# Patient Record
Sex: MALE | URBAN - METROPOLITAN AREA
[De-identification: names, ages, dates, MRNs, and addresses within clinical notes are randomized per-mention and may not be internally consistent; named-entity substitution may affect disease eponyms.]

---

## 2021-01-21 ENCOUNTER — APPOINTMENT (RX ONLY)
Dept: URBAN - METROPOLITAN AREA CLINIC 52 | Facility: CLINIC | Age: 51
Setting detail: DERMATOLOGY
End: 2021-01-21

## 2021-01-21 DIAGNOSIS — D22 MELANOCYTIC NEVI: ICD-10-CM

## 2021-01-21 DIAGNOSIS — D485 NEOPLASM OF UNCERTAIN BEHAVIOR OF SKIN: ICD-10-CM

## 2021-01-21 DIAGNOSIS — L21.8 OTHER SEBORRHEIC DERMATITIS: ICD-10-CM | Status: INADEQUATELY CONTROLLED

## 2021-01-21 PROBLEM — D22.39 MELANOCYTIC NEVI OF OTHER PARTS OF FACE: Status: ACTIVE | Noted: 2021-01-21

## 2021-01-21 PROBLEM — D48.5 NEOPLASM OF UNCERTAIN BEHAVIOR OF SKIN: Status: ACTIVE | Noted: 2021-01-21

## 2021-01-21 PROCEDURE — ? PRESCRIPTION

## 2021-01-21 PROCEDURE — 99203 OFFICE O/P NEW LOW 30 MIN: CPT

## 2021-01-21 PROCEDURE — ? DEFER

## 2021-01-21 PROCEDURE — ? FULL BODY SKIN EXAM - DECLINED

## 2021-01-21 PROCEDURE — ? COUNSELING

## 2021-01-21 RX ORDER — KETOCONAZOLE 20 MG/ML
1 SHAMPOO, SUSPENSION TOPICAL TIW
Qty: 1 | Refills: 6 | Status: ERX | COMMUNITY
Start: 2021-01-21

## 2021-01-21 RX ORDER — KETOCONAZOLE 20 MG/G
1 CREAM TOPICAL BID
Qty: 1 | Refills: 3 | Status: ERX | COMMUNITY
Start: 2021-01-21

## 2021-01-21 RX ADMIN — KETOCONAZOLE 1: 20 CREAM TOPICAL at 00:00

## 2021-01-21 RX ADMIN — KETOCONAZOLE 1: 20 SHAMPOO, SUSPENSION TOPICAL at 00:00

## 2021-01-21 ASSESSMENT — LOCATION DETAILED DESCRIPTION DERM: LOCATION DETAILED: RIGHT MEDIAL MALAR CHEEK

## 2021-01-21 ASSESSMENT — LOCATION SIMPLE DESCRIPTION DERM: LOCATION SIMPLE: RIGHT CHEEK

## 2021-01-21 ASSESSMENT — LOCATION ZONE DERM: LOCATION ZONE: FACE

## 2021-02-15 ENCOUNTER — APPOINTMENT (RX ONLY)
Dept: URBAN - METROPOLITAN AREA CLINIC 52 | Facility: CLINIC | Age: 51
Setting detail: DERMATOLOGY
End: 2021-02-15

## 2021-02-15 VITALS — TEMPERATURE: 96.8 F

## 2021-02-15 DIAGNOSIS — D485 NEOPLASM OF UNCERTAIN BEHAVIOR OF SKIN: ICD-10-CM | Status: INADEQUATELY CONTROLLED

## 2021-02-15 DIAGNOSIS — L21.8 OTHER SEBORRHEIC DERMATITIS: ICD-10-CM | Status: IMPROVED

## 2021-02-15 PROBLEM — D48.5 NEOPLASM OF UNCERTAIN BEHAVIOR OF SKIN: Status: ACTIVE | Noted: 2021-02-15

## 2021-02-15 PROCEDURE — ? BIOPSY BY SHAVE METHOD

## 2021-02-15 PROCEDURE — 99212 OFFICE O/P EST SF 10 MIN: CPT | Mod: 25

## 2021-02-15 PROCEDURE — 11102 TANGNTL BX SKIN SINGLE LES: CPT

## 2021-02-15 PROCEDURE — ? COUNSELING

## 2021-02-15 PROCEDURE — ? ADDITIONAL NOTES

## 2021-02-15 ASSESSMENT — LOCATION DETAILED DESCRIPTION DERM
LOCATION DETAILED: LEFT CENTRAL MALAR CHEEK
LOCATION DETAILED: LEFT SUPERIOR MEDIAL MIDBACK

## 2021-02-15 ASSESSMENT — LOCATION SIMPLE DESCRIPTION DERM
LOCATION SIMPLE: LEFT CHEEK
LOCATION SIMPLE: LEFT LOWER BACK

## 2021-02-15 ASSESSMENT — LOCATION ZONE DERM
LOCATION ZONE: FACE
LOCATION ZONE: TRUNK

## 2021-03-04 ENCOUNTER — APPOINTMENT (RX ONLY)
Dept: URBAN - METROPOLITAN AREA CLINIC 52 | Facility: CLINIC | Age: 51
Setting detail: DERMATOLOGY
End: 2021-03-04

## 2021-03-04 DIAGNOSIS — Z87.2 PERSONAL HISTORY OF DISEASES OF THE SKIN AND SUBCUTANEOUS TISSUE: ICD-10-CM

## 2021-03-04 PROCEDURE — 99212 OFFICE O/P EST SF 10 MIN: CPT

## 2021-03-04 PROCEDURE — ? ADDITIONAL NOTES

## 2021-03-04 PROCEDURE — ? COUNSELING

## 2021-03-04 ASSESSMENT — LOCATION SIMPLE DESCRIPTION DERM: LOCATION SIMPLE: LEFT LOWER BACK

## 2021-03-04 ASSESSMENT — LOCATION ZONE DERM: LOCATION ZONE: TRUNK

## 2021-03-04 ASSESSMENT — LOCATION DETAILED DESCRIPTION DERM: LOCATION DETAILED: LEFT SUPERIOR MEDIAL MIDBACK

## 2021-03-04 NOTE — PROCEDURE: COUNSELING
Patient Specific Counseling (Will Not Stick From Patient To Patient): Discussed with patient his results and that we will be monitoring the lesion since it was removed fully with the biopsy.
Detail Level: Detailed

## 2021-06-16 ENCOUNTER — APPOINTMENT (RX ONLY)
Dept: URBAN - METROPOLITAN AREA CLINIC 52 | Facility: CLINIC | Age: 51
Setting detail: DERMATOLOGY
End: 2021-06-16

## 2021-06-16 DIAGNOSIS — D22 MELANOCYTIC NEVI: ICD-10-CM | Status: STABLE

## 2021-06-16 DIAGNOSIS — L81.4 OTHER MELANIN HYPERPIGMENTATION: ICD-10-CM | Status: STABLE

## 2021-06-16 DIAGNOSIS — L82.1 OTHER SEBORRHEIC KERATOSIS: ICD-10-CM | Status: STABLE

## 2021-06-16 DIAGNOSIS — L21.8 OTHER SEBORRHEIC DERMATITIS: ICD-10-CM | Status: IMPROVED

## 2021-06-16 DIAGNOSIS — D18.0 HEMANGIOMA: ICD-10-CM | Status: STABLE

## 2021-06-16 DIAGNOSIS — D485 NEOPLASM OF UNCERTAIN BEHAVIOR OF SKIN: ICD-10-CM | Status: INADEQUATELY CONTROLLED

## 2021-06-16 PROBLEM — D48.5 NEOPLASM OF UNCERTAIN BEHAVIOR OF SKIN: Status: ACTIVE | Noted: 2021-06-16

## 2021-06-16 PROBLEM — D22.5 MELANOCYTIC NEVI OF TRUNK: Status: ACTIVE | Noted: 2021-06-16

## 2021-06-16 PROBLEM — D18.01 HEMANGIOMA OF SKIN AND SUBCUTANEOUS TISSUE: Status: ACTIVE | Noted: 2021-06-16

## 2021-06-16 PROCEDURE — 11103 TANGNTL BX SKIN EA SEP/ADDL: CPT

## 2021-06-16 PROCEDURE — 99212 OFFICE O/P EST SF 10 MIN: CPT | Mod: 25

## 2021-06-16 PROCEDURE — ? BIOPSY BY SHAVE METHOD

## 2021-06-16 PROCEDURE — 11102 TANGNTL BX SKIN SINGLE LES: CPT

## 2021-06-16 PROCEDURE — ? ADDITIONAL NOTES

## 2021-06-16 PROCEDURE — ? COUNSELING

## 2021-06-16 ASSESSMENT — LOCATION SIMPLE DESCRIPTION DERM
LOCATION SIMPLE: RIGHT UPPER BACK
LOCATION SIMPLE: CHEST
LOCATION SIMPLE: RIGHT CHEEK
LOCATION SIMPLE: ABDOMEN
LOCATION SIMPLE: LEFT CHEEK
LOCATION SIMPLE: LEFT UPPER BACK

## 2021-06-16 ASSESSMENT — LOCATION DETAILED DESCRIPTION DERM
LOCATION DETAILED: LEFT SUPERIOR MEDIAL UPPER BACK
LOCATION DETAILED: LEFT MEDIAL MALAR CHEEK
LOCATION DETAILED: EPIGASTRIC SKIN
LOCATION DETAILED: RIGHT MEDIAL MALAR CHEEK
LOCATION DETAILED: LEFT SUPERIOR UPPER BACK
LOCATION DETAILED: SUBXIPHOID
LOCATION DETAILED: RIGHT SUPERIOR UPPER BACK
LOCATION DETAILED: LOWER STERNUM

## 2021-06-16 ASSESSMENT — LOCATION ZONE DERM
LOCATION ZONE: TRUNK
LOCATION ZONE: FACE

## 2021-06-16 NOTE — PROCEDURE: BIOPSY BY SHAVE METHOD
Detail Level: Detailed
Depth Of Biopsy: dermis
Was A Bandage Applied: Yes
Size Of Lesion In Cm: 1
X Size Of Lesion In Cm: 0
Biopsy Type: H and E
Biopsy Method: Dermablade
Anesthesia Type: 1% lidocaine with epinephrine
Anesthesia Volume In Cc (Will Not Render If 0): 0.5
Hemostasis: Drysol
Wound Care: Petrolatum
Dressing: bandage
Destruction After The Procedure: No
Type Of Destruction Used: Curettage
Curettage Text: The wound bed was treated with curettage after the biopsy was performed.
Cryotherapy Text: The wound bed was treated with cryotherapy after the biopsy was performed.
Electrodesiccation Text: The wound bed was treated with electrodesiccation after the biopsy was performed.
Electrodesiccation And Curettage Text: The wound bed was treated with electrodesiccation and curettage after the biopsy was performed.
Silver Nitrate Text: The wound bed was treated with silver nitrate after the biopsy was performed.
Lab: -[id number]
Lab Facility: [id number]
Consent: Written consent was obtained and risks were reviewed including but not limited to scarring, infection, bleeding, scabbing, incomplete removal, nerve damage and allergy to anesthesia.
Post-Care Instructions: I reviewed with the patient in detail post-care instructions. Patient is to keep the biopsy site dry overnight, and then apply bacitracin twice daily until healed. Patient may apply hydrogen peroxide soaks to remove any crusting.
Notification Instructions: Patient will be notified of biopsy results. However, patient instructed to call the office if not contacted within 2 weeks.
Billing Type: Third-Party Bill
Information: Selecting Yes will display possible errors in your note based on the variables you have selected. This validation is only offered as a suggestion for you. PLEASE NOTE THAT THE VALIDATION TEXT WILL BE REMOVED WHEN YOU FINALIZE YOUR NOTE. IF YOU WANT TO FAX A PRELIMINARY NOTE YOU WILL NEED TO TOGGLE THIS TO 'NO' IF YOU DO NOT WANT IT IN YOUR FAXED NOTE.
Size Of Lesion In Cm: 0.4

## 2021-06-30 ENCOUNTER — APPOINTMENT (RX ONLY)
Dept: URBAN - METROPOLITAN AREA CLINIC 52 | Facility: CLINIC | Age: 51
Setting detail: DERMATOLOGY
End: 2021-06-30

## 2021-06-30 DIAGNOSIS — D22 MELANOCYTIC NEVI: ICD-10-CM

## 2021-06-30 PROBLEM — D22.5 MELANOCYTIC NEVI OF TRUNK: Status: ACTIVE | Noted: 2021-06-30

## 2021-06-30 PROCEDURE — 12032 INTMD RPR S/A/T/EXT 2.6-7.5: CPT

## 2021-06-30 PROCEDURE — ? ADDITIONAL NOTES

## 2021-06-30 PROCEDURE — 11402 EXC TR-EXT B9+MARG 1.1-2 CM: CPT

## 2021-06-30 PROCEDURE — ? EXCISION

## 2021-06-30 ASSESSMENT — LOCATION DETAILED DESCRIPTION DERM: LOCATION DETAILED: RIGHT SUPERIOR UPPER BACK

## 2021-06-30 ASSESSMENT — LOCATION SIMPLE DESCRIPTION DERM: LOCATION SIMPLE: RIGHT UPPER BACK

## 2021-06-30 ASSESSMENT — LOCATION ZONE DERM: LOCATION ZONE: TRUNK

## 2022-02-09 NOTE — PROCEDURE: EXCISION
Problem: Nutritional:  Goal: Achieve adequate nutritional intake  Description: Patient will consume >50% of meals  Outcome: Progressing      Size Of Lesion In Cm: 1

## 2022-11-08 NOTE — PROCEDURE: EXCISION
O-Z Flap Text: The defect edges were debeveled with a #15 scalpel blade.  Given the location of the defect, shape of the defect and the proximity to free margins an O-Z flap was deemed most appropriate.  Using a sterile surgical marker, an appropriate transposition flap was drawn incorporating the defect and placing the expected incisions within the relaxed skin tension lines where possible. The area thus outlined was incised deep to adipose tissue with a #15 scalpel blade.  The skin margins were undermined to an appropriate distance in all directions utilizing iris scissors. Xolair Counseling:  Patient informed of potential adverse effects including but not limited to fever, muscle aches, rash and allergic reactions.  The patient verbalized understanding of the proper use and possible adverse effects of Xolair.  All of the patient's questions and concerns were addressed.